# Patient Record
Sex: MALE | Race: OTHER | HISPANIC OR LATINO
[De-identification: names, ages, dates, MRNs, and addresses within clinical notes are randomized per-mention and may not be internally consistent; named-entity substitution may affect disease eponyms.]

---

## 2017-04-04 ENCOUNTER — APPOINTMENT (OUTPATIENT)
Dept: GASTROENTEROLOGY | Facility: CLINIC | Age: 57
End: 2017-04-04

## 2017-04-04 VITALS
SYSTOLIC BLOOD PRESSURE: 128 MMHG | BODY MASS INDEX: 32.49 KG/M2 | OXYGEN SATURATION: 96 % | DIASTOLIC BLOOD PRESSURE: 76 MMHG | HEART RATE: 66 BPM | HEIGHT: 65 IN | WEIGHT: 195 LBS | RESPIRATION RATE: 14 BRPM | TEMPERATURE: 98.1 F

## 2018-08-13 ENCOUNTER — APPOINTMENT (OUTPATIENT)
Dept: ORTHOPEDIC SURGERY | Facility: CLINIC | Age: 58
End: 2018-08-13
Payer: COMMERCIAL

## 2018-08-13 VITALS
WEIGHT: 192 LBS | SYSTOLIC BLOOD PRESSURE: 125 MMHG | HEART RATE: 87 BPM | BODY MASS INDEX: 31.99 KG/M2 | DIASTOLIC BLOOD PRESSURE: 68 MMHG | HEIGHT: 65 IN

## 2018-08-13 DIAGNOSIS — Z86.39 PERSONAL HISTORY OF OTHER ENDOCRINE, NUTRITIONAL AND METABOLIC DISEASE: ICD-10-CM

## 2018-08-13 DIAGNOSIS — M54.5 LOW BACK PAIN: ICD-10-CM

## 2018-08-13 DIAGNOSIS — Z87.891 PERSONAL HISTORY OF NICOTINE DEPENDENCE: ICD-10-CM

## 2018-08-13 PROCEDURE — 99204 OFFICE O/P NEW MOD 45 MIN: CPT

## 2018-08-13 RX ORDER — DOXYCYCLINE 100 MG/1
100 CAPSULE ORAL
Qty: 14 | Refills: 0 | Status: ACTIVE | COMMUNITY
Start: 2018-04-10

## 2018-08-13 RX ORDER — LEVOTHYROXINE SODIUM 0.09 MG/1
88 TABLET ORAL
Qty: 90 | Refills: 0 | Status: ACTIVE | COMMUNITY
Start: 2018-07-23

## 2018-08-13 RX ORDER — BENZONATATE 200 MG/1
200 CAPSULE ORAL
Qty: 20 | Refills: 0 | Status: ACTIVE | COMMUNITY
Start: 2018-04-10

## 2018-08-13 RX ORDER — LEVOCETIRIZINE DIHYDROCHLORIDE 5 MG/1
5 TABLET ORAL
Qty: 30 | Refills: 0 | Status: ACTIVE | COMMUNITY
Start: 2018-07-26

## 2018-08-13 RX ORDER — EZETIMIBE AND SIMVASTATIN 10; 40 MG/1; MG/1
10-40 TABLET ORAL
Refills: 0 | Status: ACTIVE | COMMUNITY

## 2018-08-13 RX ORDER — FENOFIBRIC ACID 45 MG/1
CAPSULE, DELAYED RELEASE ORAL
Refills: 0 | Status: ACTIVE | COMMUNITY

## 2018-08-13 RX ORDER — EZETIMIBE AND SIMVASTATIN 10; 20 MG/1; MG/1
10-20 TABLET ORAL
Qty: 90 | Refills: 0 | Status: ACTIVE | COMMUNITY
Start: 2018-03-19

## 2018-08-13 RX ORDER — TOBRAMYCIN 3 MG/ML
0.3 SOLUTION/ DROPS OPHTHALMIC
Qty: 5 | Refills: 0 | Status: ACTIVE | COMMUNITY
Start: 2018-04-10

## 2018-08-13 RX ORDER — MUPIROCIN 20 MG/G
2 OINTMENT TOPICAL
Qty: 22 | Refills: 0 | Status: ACTIVE | COMMUNITY
Start: 2018-07-26

## 2018-08-13 RX ORDER — CLOBETASOL PROPIONATE 0.5 MG/G
0.05 GEL TOPICAL
Qty: 60 | Refills: 0 | Status: ACTIVE | COMMUNITY
Start: 2018-07-26

## 2018-08-13 RX ORDER — MOMETASONE 50 UG/1
50 SPRAY, METERED NASAL
Qty: 17 | Refills: 0 | Status: ACTIVE | COMMUNITY
Start: 2018-03-17

## 2018-08-13 RX ORDER — METHYLPREDNISOLONE 4 MG/1
4 TABLET ORAL
Qty: 21 | Refills: 0 | Status: ACTIVE | COMMUNITY
Start: 2018-06-04

## 2018-08-13 RX ORDER — PREDNISONE 10 MG/1
10 TABLET ORAL
Qty: 54 | Refills: 0 | Status: ACTIVE | COMMUNITY
Start: 2018-07-26

## 2018-08-13 RX ORDER — FENOFIBRIC ACID 135 MG/1
135 CAPSULE, DELAYED RELEASE ORAL
Qty: 90 | Refills: 0 | Status: ACTIVE | COMMUNITY
Start: 2018-03-19

## 2018-08-13 RX ORDER — LEVOTHYROXINE SODIUM 75 UG/1
75 TABLET ORAL
Refills: 0 | Status: ACTIVE | COMMUNITY

## 2018-08-13 RX ORDER — CALCIUM CARBONATE/VITAMIN D3 600 MG-10
TABLET ORAL
Refills: 0 | Status: ACTIVE | COMMUNITY

## 2018-08-13 RX ORDER — UBIDECARENONE/VIT E ACET 100MG-5
CAPSULE ORAL
Refills: 0 | Status: ACTIVE | COMMUNITY

## 2019-09-10 ENCOUNTER — APPOINTMENT (OUTPATIENT)
Dept: GASTROENTEROLOGY | Facility: CLINIC | Age: 59
End: 2019-09-10
Payer: COMMERCIAL

## 2019-09-10 VITALS
SYSTOLIC BLOOD PRESSURE: 120 MMHG | DIASTOLIC BLOOD PRESSURE: 82 MMHG | TEMPERATURE: 98 F | BODY MASS INDEX: 32.32 KG/M2 | RESPIRATION RATE: 14 BRPM | WEIGHT: 194 LBS | HEART RATE: 67 BPM | OXYGEN SATURATION: 96 % | HEIGHT: 65 IN

## 2019-09-10 PROCEDURE — 99214 OFFICE O/P EST MOD 30 MIN: CPT

## 2019-09-10 NOTE — PHYSICAL EXAM
[General Appearance - Alert] : alert [General Appearance - In No Acute Distress] : in no acute distress [Sclera] : the sclera and conjunctiva were normal [Outer Ear] : the ears and nose were normal in appearance [Neck Appearance] : the appearance of the neck was normal [Auscultation Breath Sounds / Voice Sounds] : lungs were clear to auscultation bilaterally [Heart Sounds] : normal S1 and S2 [Heart Rate And Rhythm] : heart rate was normal and rhythm regular [Heart Sounds Gallop] : no gallops [Murmurs] : no murmurs [Heart Sounds Pericardial Friction Rub] : no pericardial rub [Bowel Sounds] : normal bowel sounds [Abdomen Soft] : soft [Abdomen Tenderness] : non-tender [] : no hepato-splenomegaly [Abdomen Mass (___ Cm)] : no abdominal mass palpated [Abnormal Walk] : normal gait [Skin Color & Pigmentation] : normal skin color and pigmentation [Oriented To Time, Place, And Person] : oriented to person, place, and time

## 2019-09-10 NOTE — CONSULT LETTER
[Dear  ___] : Dear  [unfilled], [Courtesy Letter:] : I had the pleasure of seeing your patient, [unfilled], in my office today. [Please see my note below.] : Please see my note below. [Consult Closing:] : Thank you very much for allowing me to participate in the care of this patient.  If you have any questions, please do not hesitate to contact me. [Sincerely,] : Sincerely, [FreeTextEntry2] : Chris Travis

## 2019-09-10 NOTE — ASSESSMENT
[FreeTextEntry1] : 58yo M presented to office for followup after his second episode of diverticulitis s/p antibiotic therapy. \par \par \par \par Plan: \par \par Advised increased hydration, fiber supplements to regulate bowel movements. Patient is due for colonoscopy in 2020, advised that he call to schedule then. \par

## 2019-09-10 NOTE — HISTORY OF PRESENT ILLNESS
[de-identified] : 60yo M with pmh Thyroid Dz, HLD referred by PCP for followup after a recent episode of diverticulitis. A few weeks ago, patient had developed LLQ pain and constipation, and had a CT scan which noted diverticulitis. He was treated with metronidazole and ciprofloxacin, and reports a significant improvement in symptoms since then, with only complaint of mild tenderness at LLQ. Recent WBC count ordered by PCP was normal. He states he has regular bms at this time.\par Of note, his last colonoscopy in 2010 was wnl.\par

## 2019-12-17 ENCOUNTER — APPOINTMENT (OUTPATIENT)
Dept: GASTROENTEROLOGY | Facility: CLINIC | Age: 59
End: 2019-12-17

## 2020-06-12 ENCOUNTER — EMERGENCY (EMERGENCY)
Facility: HOSPITAL | Age: 60
LOS: 1 days | Discharge: HOME | End: 2020-06-12
Attending: STUDENT IN AN ORGANIZED HEALTH CARE EDUCATION/TRAINING PROGRAM | Admitting: EMERGENCY MEDICINE
Payer: OTHER MISCELLANEOUS

## 2020-06-12 VITALS
SYSTOLIC BLOOD PRESSURE: 161 MMHG | OXYGEN SATURATION: 98 % | HEART RATE: 90 BPM | DIASTOLIC BLOOD PRESSURE: 93 MMHG | TEMPERATURE: 98 F | RESPIRATION RATE: 16 BRPM

## 2020-06-12 LAB
ALBUMIN SERPL ELPH-MCNC: 4.7 G/DL — SIGNIFICANT CHANGE UP (ref 3.3–5)
ALP SERPL-CCNC: 41 U/L — SIGNIFICANT CHANGE UP (ref 40–120)
ALT FLD-CCNC: 45 U/L — HIGH (ref 4–41)
ANION GAP SERPL CALC-SCNC: 15 MMO/L — HIGH (ref 7–14)
APTT BLD: 28.5 SEC — SIGNIFICANT CHANGE UP (ref 27.5–36.3)
AST SERPL-CCNC: 28 U/L — SIGNIFICANT CHANGE UP (ref 4–40)
BILIRUB SERPL-MCNC: 0.3 MG/DL — SIGNIFICANT CHANGE UP (ref 0.2–1.2)
BLD GP AB SCN SERPL QL: NEGATIVE — SIGNIFICANT CHANGE UP
BUN SERPL-MCNC: 21 MG/DL — SIGNIFICANT CHANGE UP (ref 7–23)
CALCIUM SERPL-MCNC: 9.9 MG/DL — SIGNIFICANT CHANGE UP (ref 8.4–10.5)
CHLORIDE SERPL-SCNC: 108 MMOL/L — HIGH (ref 98–107)
CO2 SERPL-SCNC: 20 MMOL/L — LOW (ref 22–31)
CREAT SERPL-MCNC: 0.73 MG/DL — SIGNIFICANT CHANGE UP (ref 0.5–1.3)
GLUCOSE SERPL-MCNC: 100 MG/DL — HIGH (ref 70–99)
HCT VFR BLD CALC: 41.1 % — SIGNIFICANT CHANGE UP (ref 39–50)
HGB BLD-MCNC: 13.6 G/DL — SIGNIFICANT CHANGE UP (ref 13–17)
INR BLD: 0.91 — SIGNIFICANT CHANGE UP (ref 0.88–1.17)
MCHC RBC-ENTMCNC: 29.4 PG — SIGNIFICANT CHANGE UP (ref 27–34)
MCHC RBC-ENTMCNC: 33.1 % — SIGNIFICANT CHANGE UP (ref 32–36)
MCV RBC AUTO: 88.8 FL — SIGNIFICANT CHANGE UP (ref 80–100)
NRBC # FLD: 0 K/UL — SIGNIFICANT CHANGE UP (ref 0–0)
PLATELET # BLD AUTO: 307 K/UL — SIGNIFICANT CHANGE UP (ref 150–400)
PMV BLD: 8.9 FL — SIGNIFICANT CHANGE UP (ref 7–13)
POTASSIUM SERPL-MCNC: 4.2 MMOL/L — SIGNIFICANT CHANGE UP (ref 3.5–5.3)
POTASSIUM SERPL-SCNC: 4.2 MMOL/L — SIGNIFICANT CHANGE UP (ref 3.5–5.3)
PROT SERPL-MCNC: 7.4 G/DL — SIGNIFICANT CHANGE UP (ref 6–8.3)
PROTHROM AB SERPL-ACNC: 10.4 SEC — SIGNIFICANT CHANGE UP (ref 9.8–13.1)
RBC # BLD: 4.63 M/UL — SIGNIFICANT CHANGE UP (ref 4.2–5.8)
RBC # FLD: 14.5 % — SIGNIFICANT CHANGE UP (ref 10.3–14.5)
RH IG SCN BLD-IMP: POSITIVE — SIGNIFICANT CHANGE UP
SODIUM SERPL-SCNC: 143 MMOL/L — SIGNIFICANT CHANGE UP (ref 135–145)
WBC # BLD: 5.39 K/UL — SIGNIFICANT CHANGE UP (ref 3.8–10.5)
WBC # FLD AUTO: 5.39 K/UL — SIGNIFICANT CHANGE UP (ref 3.8–10.5)

## 2020-06-12 PROCEDURE — 73140 X-RAY EXAM OF FINGER(S): CPT | Mod: 26,RT

## 2020-06-12 PROCEDURE — 99284 EMERGENCY DEPT VISIT MOD MDM: CPT

## 2020-06-12 RX ORDER — OXYCODONE AND ACETAMINOPHEN 5; 325 MG/1; MG/1
1 TABLET ORAL ONCE
Refills: 0 | Status: DISCONTINUED | OUTPATIENT
Start: 2020-06-12 | End: 2020-06-12

## 2020-06-12 RX ADMIN — OXYCODONE AND ACETAMINOPHEN 1 TABLET(S): 5; 325 TABLET ORAL at 15:26

## 2020-06-12 RX ADMIN — Medication 100 MILLIGRAM(S): at 15:50

## 2020-06-12 NOTE — ED PROVIDER NOTE - ATTENDING CONTRIBUTION TO CARE
60 y/o M sent in from [facility] with an open fracture of the left 2nd distal phalanx and an oblique fracture of the left 1st distal phalanx. Plan to obtain x-ray, obtain labs, provide pain control, and hand surgeon consulted.    CHIRAG Haywood: I have personally performed a face to face bedside history and physical examination of this patient. I have discussed the history, examination, review of systems, assessment and plan of management with the resident. I have reviewed the electronic medical record and amended it to reflect my history, review of systems, physical exam, assessment and plan.

## 2020-06-12 NOTE — ED PROVIDER NOTE - PATIENT PORTAL LINK FT
You can access the FollowMyHealth Patient Portal offered by [facility] by registering at the following website: [email or web address]. By joining [employer] [facility]’s FollowMyHealth portal, you will also be able to view your health information using other applications (apps) compatible with our system.

## 2020-06-12 NOTE — ED PROVIDER NOTE - CARE PROVIDER_API CALL
Mila Rodriguez (MD)  Surgery  [address], [address]  [place] [address]  Phone: [phone]  Fax: [phone]  Scheduled Appointment: 06/16/2020

## 2020-06-12 NOTE — PROCEDURE NOTE - NSLAC1DETAILSPROC_SKIN_A_CORE
wound explored to base in bloodless field/non-extensive debridement/nail excised/all foreign material removed/subungal hematoma evacuated

## 2020-06-12 NOTE — ED ADULT NURSE NOTE - OBJECTIVE STATEMENT
Patient reports that he cut his finger at work, patient noted with gauze to his finger and with bleeding controlled

## 2020-06-12 NOTE — CONSULT NOTE ADULT - SUBJECTIVE AND OBJECTIVE BOX
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  He was initially seen at [facility] and transferred to [facility] to see hand specialist.  Was given tetanus and clindamycin.  xray revealed distal phalanx fracture.    PMH/PSH: hypothyroid, hyperlipidemia, HTN  NKDA  Meds: thyroid, statin, anti-HTN  Soc: RHD, nonsmoker, [employer]  Fam: noncontrib    ROS: as per HPi and ow neg    PE:  NAD  Alert  MMM  pERRL  Right index finger with oblique laceration ulnar to nail  Distal tip viable, NVI  ROM limited by pain, swelling  Large subungal hematoma    xray: distal phalanx fracture    A/P: 58 yo RHD M with RIF laceration and distal phalanx fracture.    Subungal hematoma evacuated, fracture reduced, laceration repaired as per procedure note  Keep dressing clean and dry  Follow up TUES for 1st dressing change  Home on abx, pain medication    Mila Rodriguez MD  Plastic Surgery

## 2020-06-12 NOTE — ED PROVIDER NOTE - NSFOLLOWUPINSTRUCTIONS_ED_ALL_ED_FT
- take antibiotics as prescribed  - follow up with hand surgeon's office on Tuesday    1) Take 400-600mg Ibuprofen (also called Advil or Mortrin) every 6 hours as needed for fever/pain/discomfort. You can take this with Tylenol 650-1000 mg (also called acetaminophen) every 6 hours. You can take these medications 3 hrs apart in order to have a layered effect. Don't use more than 4000 mg of Tylenol in any 24-hour period. Make sure your other prescription/over-the-counter medications don't contain any Tylenol so you don't take too much. If you have any stomach discomfort while taking Motrin, you can use TUMS or Pepcid or Zantac (these can all be bought without a prescription). Please do not take these medications if you do no have pain or if you have any history of bleeding disorders, kidney or liver disease. Do not use ibuprofen if you are on blood thinners (anti-coagulation).  2) If your pain is NOT well controlled with these medications, take pain medication as prescribed.  3) Drink at least 2 Liters or 64 Ounces of water each day.

## 2020-06-12 NOTE — ED PROVIDER NOTE - PROGRESS NOTE DETAILS
PGY2/MD Melvina. Consulted, Dr. Leung's office. spoke to the on-call doc, abx with [facility], pt will be seen by the hand surgeon soon. PGY2/MD Melvina. Hand surgeon was at the bedside, sutured, follow up with their office on next Tuesdeay. augmentin, for home. I have given the pt strict return and follow up precautions. The patient has been provided with a copy of all pertinent results. The patient has been informed of all concerning signs and symptoms to return to Emergency Department, the necessity to follow up with PMD/Clinic/follow up provided within 2-3 days was explained, and the patient reports understanding of above with capacity and insight.

## 2020-06-12 NOTE — ED PROVIDER NOTE - PHYSICAL EXAMINATION
PGY2/MD Melvina.   Left Hand Exam  Sensation: Sensation intact to light touch in first dorsalis web space, 5th/3rd finger volar tip.  Motor: + 2nd finger, distal pharynx, wobling, bluish discolored nail, volar part with pink good blood flow, + oozing bleed  Skin/Inspection: + skin defect,   Vascular: volar part of the finger has blood flow,

## 2020-06-12 NOTE — ED ADULT NURSE NOTE - CHIEF COMPLAINT QUOTE
Problem: Patient Care Overview  Goal: Plan of Care Review  Outcome: Ongoing (interventions implemented as appropriate)  Patient AAO x 4. VSS. NAD. Pain controlled via PRN pain meds. 2L oxygen. Uses bedpan to void.  No complaints of nausea or vomiting. Currently awaiting discharge to SNF. Safety maintained. Will continue to monitor.        Pt with laceration to R index finger.  Bleeding controlled

## 2020-06-12 NOTE — ED PROVIDER NOTE - CLINICAL SUMMARY MEDICAL DECISION MAKING FREE TEXT BOX
58 y/o M sent in from [facility] with an open fracture of the left 2nd distal phalanx and an oblique fracture of the left 1st distal phalanx. Plan to obtain x-ray, obtain labs, provide pain control, and hand surgeon consulted.

## 2020-06-12 NOTE — ED PROVIDER NOTE - OBJECTIVE STATEMENT
58 y/o M with no significant PMHx sent from [facility] today for open fracture. Pt was seen today at [facility] after cutting fingers while using a saw belt machine at home. At the hospital pt was found to have an open fracture of the 2nd distal phalanx of the left hand and an oblique fracture of the 1st distal phalanx of the left hand. Pt reports that at [facility] his tetanus shot was updated , he was given Clindamycin, and he received a lidocaine injection for pain control. No ibuprofen or Tylenol was given prior to arrival. Denies having any fever, chills, chest pain, SOB, or other medical complaints. No other injuries or trauma.

## 2021-07-26 NOTE — PROCEDURE NOTE - NSLAC1DEPTH_SKIN_A_CORE
Patient's first and last name, , procedure, and correct site confirmed prior to the start of procedure.
skin
Patient's first and last name, , procedure, and correct site confirmed prior to the start of procedure.

## 2022-08-17 PROBLEM — Z78.9 OTHER SPECIFIED HEALTH STATUS: Chronic | Status: ACTIVE | Noted: 2020-06-12

## 2022-08-25 ENCOUNTER — APPOINTMENT (OUTPATIENT)
Dept: ORTHOPEDIC SURGERY | Facility: CLINIC | Age: 62
End: 2022-08-25

## 2022-08-25 VITALS
HEIGHT: 65 IN | BODY MASS INDEX: 29.16 KG/M2 | SYSTOLIC BLOOD PRESSURE: 130 MMHG | WEIGHT: 175 LBS | HEART RATE: 68 BPM | DIASTOLIC BLOOD PRESSURE: 73 MMHG

## 2022-08-25 PROCEDURE — 99072 ADDL SUPL MATRL&STAF TM PHE: CPT

## 2022-08-25 PROCEDURE — 99204 OFFICE O/P NEW MOD 45 MIN: CPT

## 2022-08-25 NOTE — PHYSICAL EXAM
[de-identified] : Constitutional: Well-nourished, well-developed, No acute distress\par Respiratory:  Good respiratory effort, no SOB\par Psychiatric: Pleasant and normal affect, alert and oriented x3\par Skin: Clean dry and intact B/L UE\par Musculoskeletal: normal except where as noted in regional exam\par \par \par Right Shoulder:\par APPEARANCE: no marked deformities, no swelling or malalignment\par POSITIVE TENDERNESS: none\par NONTENDER: supraspinatus, infraspinatus, teres minor, LH biceps, anterior and posterior capsule, AC joint\par ROM: full & painless, no scapular winging or dyskinesia present\par RESISTIVE TESTING: painless 5/5 resisted flex/ext, empty can/ER/IR, horizontal abd/add \par SPECIAL TESTS: neg Drop Arm, neg Empty Can, neg Morales/Neers, neg Tran's, neg Speeds, neg Apprehension, neg cross arm adduction, neg apley's scratch test\par \par Left Shoulder:\par APPEARANCE: no marked deformities, no swelling or malalignment\par POSITIVE TENDERNESS: supraspinatus, long head biceps tendon\par NONTENDER:  infraspinatus, teres minor. biceps. anterior and posterior capsule. AC joint. \par ROM: full with mild painful arc past 60 degrees, no scapular winging or dyskinesia present\par RESISTIVE TESTING: MMT 4+/5 ER, Flexion and Empty can, 5/5 IR. painless 5/5 resisted ext, horizontal abd/add \par SPECIAL TESTS: + Morales and Neers, mildly + cross arm adduction, + Speeds, neg Tran's, neg Drop Arm, neg Apprehension. neg apley's scratch test\par \par  [de-identified] : I reviewed, interpreted and clinically correlated the following outside imaging studies,\par Outside x-rays, 3 views of left shoulder, evidence of mild glenohumeral osteoarthritis, there is a minimal punctate calcific deposit on the posterolateral portion of the humeral head consistent with calcific tendinitis, no acute fracture seen.\par

## 2022-08-25 NOTE — DISCUSSION/SUMMARY
[de-identified] : Discussed findings of today's exam and possible causes of patient's pain.  Educated patient on their most probable diagnosis of acute left shoulder impingement status post fall.  Reviewed possible courses of treatment, and we collaboratively decided best course of treatment at this time will include conservative management.  Patient started on a course of oral NSAIDs, prescription given for Naprosyn (We discussed all possible side effects of this medication).  Patient will be started on a course of physical therapy to restore normal range of motion and strength as tolerated.  Patient has been able to continue his regular work duties since time of injury, he may continue to do so.  Patient is given reassurance he has no evidence of a significant rotator cuff tear or rupture, no surgical indications, no need for MRI at this time.  Follow up as needed.  Patient and spouse appreciate and agree with current plan.\par \par I work as part of an academic orthopedic group and routinely have a physician in training (resident / fellow) working with me.  Any part of the history and physical exam performed by the physician in training was either directly reviewed and/or replicated by myself.  Any procedure performed by the physician in training was performed under my direct supervision and with the consent of the patient.\par \par This note was generated using dragon medical dictation software.  A reasonable effort has been made for proofreading its contents, but typos may still remain.  If there are any questions or points of clarification needed please notify my office.

## 2022-08-25 NOTE — HISTORY OF PRESENT ILLNESS
[de-identified] : LHD Patient is here for left shoulder pain that began on 8/6/22 when he was stripping a floor and he slipped. He was seen at [facility] where xrays were taken that were negative for fracture. He was prescribed an NSAID and muscle relaxer which has helped. Denies N/T/R/Prior injury. He took a few days off but returned to work. \par \par The patient's past medical history, past surgical history, medications and allergies were reviewed by me today and documented accordingly. In addition, the patient's family and social history, which were noncontributory to this visit, were reviewed also. Intake form was reviewed. The patient has no family history of arthritis.

## 2022-09-21 ENCOUNTER — RX RENEWAL (OUTPATIENT)
Age: 62
End: 2022-09-21

## 2022-09-21 RX ORDER — NAPROXEN 500 MG/1
500 TABLET ORAL
Qty: 60 | Refills: 0 | Status: ACTIVE | COMMUNITY
Start: 2022-08-25 | End: 1900-01-01

## 2022-10-24 ENCOUNTER — RX RENEWAL (OUTPATIENT)
Age: 62
End: 2022-10-24

## 2022-12-15 ENCOUNTER — APPOINTMENT (OUTPATIENT)
Dept: ORTHOPEDIC SURGERY | Facility: CLINIC | Age: 62
End: 2022-12-15

## 2022-12-15 VITALS
SYSTOLIC BLOOD PRESSURE: 116 MMHG | HEIGHT: 65 IN | BODY MASS INDEX: 29.32 KG/M2 | OXYGEN SATURATION: 98 % | TEMPERATURE: 97.9 F | DIASTOLIC BLOOD PRESSURE: 70 MMHG | HEART RATE: 75 BPM | WEIGHT: 176 LBS

## 2022-12-15 PROCEDURE — 99213 OFFICE O/P EST LOW 20 MIN: CPT

## 2022-12-15 PROCEDURE — 99072 ADDL SUPL MATRL&STAF TM PHE: CPT

## 2022-12-15 NOTE — PHYSICAL EXAM
[de-identified] : Constitutional: Well-nourished, well-developed, No acute distress\par Respiratory:  Good respiratory effort, no SOB\par Psychiatric: Pleasant and normal affect, alert and oriented x3\par Skin: Clean dry and intact B/L UE\par Musculoskeletal: normal except where as noted in regional exam\par \par Left Shoulder:\par APPEARANCE: no marked deformities, no swelling or malalignment\par POSITIVE TENDERNESS: supraspinatus, long head biceps tendon\par NONTENDER:  infraspinatus, teres minor. biceps. anterior and posterior capsule. AC joint. \par ROM: full with mild painful arc past 60 degrees, no scapular winging or dyskinesia present\par RESISTIVE TESTING: MMT 4+/5 ER, Flexion and Empty can, 5/5 IR. painless 5/5 resisted ext, horizontal abd/add \par SPECIAL TESTS: + Morales and Neers, mildly + cross arm adduction, + Speeds, neg Tran's, neg Drop Arm, neg Apprehension. neg apley's scratch test\par \par

## 2022-12-15 NOTE — HISTORY OF PRESENT ILLNESS
[de-identified] : Patient is here for left shoulder pain follow up. Patient is attending PT and has noticed improvement. He would like to continue going. He has pain with crossarm adduction. No recent injury.

## 2022-12-15 NOTE — DISCUSSION/SUMMARY
[de-identified] : Patient was seen today for reevaluation of chronic intermittent left shoulder pain, he still has exam findings consistent with mild subacromial impingement.  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  Patient has been regularly going to physical therapy, recommend continued course of physical therapy at this time.  No indication for MRI as patient has no surgical indications based on history and clinical exam.  Patient's pain level is only mild and intermittent, no need for cortisone injection at this time.  Follow up as needed.  Patient appreciates and agrees with current plan.\par \par I work as part of an academic orthopedic group and routinely have a physician in training (resident / fellow) working with me.  Any part of the history and physical exam performed by the physician in training was either directly reviewed and/or replicated by myself.  Any procedure performed by the physician in training was performed under my direct supervision and with the consent of the patient.\par \par This note was generated using dragon medical dictation software.  A reasonable effort has been made for proofreading its contents, but typos may still remain.  If there are any questions or points of clarification needed please notify my office.\par

## 2022-12-18 ENCOUNTER — FORM ENCOUNTER (OUTPATIENT)
Age: 62
End: 2022-12-18

## 2023-02-06 ENCOUNTER — FORM ENCOUNTER (OUTPATIENT)
Age: 63
End: 2023-02-06

## 2023-03-16 ENCOUNTER — APPOINTMENT (OUTPATIENT)
Dept: ORTHOPEDIC SURGERY | Facility: CLINIC | Age: 63
End: 2023-03-16
Payer: OTHER MISCELLANEOUS

## 2023-03-16 VITALS
HEART RATE: 68 BPM | DIASTOLIC BLOOD PRESSURE: 77 MMHG | OXYGEN SATURATION: 96 % | BODY MASS INDEX: 29.16 KG/M2 | WEIGHT: 175 LBS | SYSTOLIC BLOOD PRESSURE: 129 MMHG | TEMPERATURE: 98 F | HEIGHT: 65 IN

## 2023-03-16 DIAGNOSIS — M75.42 IMPINGEMENT SYNDROME OF LEFT SHOULDER: ICD-10-CM

## 2023-03-16 PROCEDURE — 99072 ADDL SUPL MATRL&STAF TM PHE: CPT

## 2023-03-16 PROCEDURE — 99213 OFFICE O/P EST LOW 20 MIN: CPT

## 2023-03-20 PROBLEM — M75.42 SHOULDER IMPINGEMENT, LEFT: Status: ACTIVE | Noted: 2022-08-25

## 2023-03-20 NOTE — DISCUSSION/SUMMARY
[de-identified] : Patient was seen today for reevaluation of chronic intermittent left shoulder pain  due to subacromial impingement.  Patient has no significant interval change in his condition since last evaluation.  Patient may continue his course of physical therapy.  Patient does not require any cortisone injection at this time as this is primarily indicated in the acute setting.  Patient has been able to continue his regular work duties without restrictions.  Due to the chronicity of the patient's pain and lack of response to conservative measures I recommend we proceed with MRI of the left shoulder at this time to evaluate for extent of partial rotator cuff tearing.  If there is any tearing found on MRI patient would benefit from surgical consultation to discuss risk/benefits of surgical intervention versus continued conservative care.  Patient will follow-up once MRI results are available.  Patient appreciates and agrees with current plan.\par \par \par This note was generated using dragon medical dictation software.  A reasonable effort has been made for proofreading its contents, but typos may still remain.  If there are any questions or points of clarification needed please notify my office.

## 2023-03-20 NOTE — PHYSICAL EXAM
[de-identified] : Constitutional: Well-nourished, well-developed, No acute distress\par Respiratory:  Good respiratory effort, no SOB\par Psychiatric: Pleasant and normal affect, alert and oriented x3\par Skin: Clean dry and intact B/L UE\par Musculoskeletal: normal except where as noted in regional exam\par \par Left Shoulder:\par APPEARANCE: no marked deformities, no swelling or malalignment\par POSITIVE TENDERNESS: supraspinatus, long head biceps tendon\par NONTENDER:  infraspinatus, teres minor. biceps. anterior and posterior capsule. AC joint. \par ROM: full with mild painful arc past 60 degrees, no scapular winging or dyskinesia present\par RESISTIVE TESTING: MMT 4+/5 ER, Flexion and Empty can, 5/5 IR. painless 5/5 resisted ext, horizontal abd/add \par SPECIAL TESTS: + Morales and Neers, mildly + cross arm adduction, + Speeds, neg Tran's, neg Drop Arm, neg Apprehension. neg apley's scratch test\par \par

## 2023-03-22 ENCOUNTER — FORM ENCOUNTER (OUTPATIENT)
Age: 63
End: 2023-03-22

## 2023-04-19 ENCOUNTER — TRANSCRIPTION ENCOUNTER (OUTPATIENT)
Age: 63
End: 2023-04-19

## 2023-04-20 ENCOUNTER — OUTPATIENT (OUTPATIENT)
Dept: OUTPATIENT SERVICES | Facility: HOSPITAL | Age: 63
LOS: 1 days | End: 2023-04-20
Payer: COMMERCIAL

## 2023-04-20 ENCOUNTER — APPOINTMENT (OUTPATIENT)
Dept: MRI IMAGING | Facility: CLINIC | Age: 63
End: 2023-04-20
Payer: OTHER MISCELLANEOUS

## 2023-04-20 DIAGNOSIS — M75.42 IMPINGEMENT SYNDROME OF LEFT SHOULDER: ICD-10-CM

## 2023-04-20 PROCEDURE — 73221 MRI JOINT UPR EXTREM W/O DYE: CPT | Mod: 26,LT

## 2023-04-20 PROCEDURE — 73221 MRI JOINT UPR EXTREM W/O DYE: CPT

## 2023-04-25 ENCOUNTER — NON-APPOINTMENT (OUTPATIENT)
Age: 63
End: 2023-04-25

## 2023-04-27 ENCOUNTER — NON-APPOINTMENT (OUTPATIENT)
Age: 63
End: 2023-04-27

## 2023-05-01 ENCOUNTER — NON-APPOINTMENT (OUTPATIENT)
Age: 63
End: 2023-05-01

## 2023-05-11 ENCOUNTER — APPOINTMENT (OUTPATIENT)
Dept: ORTHOPEDIC SURGERY | Facility: CLINIC | Age: 63
End: 2023-05-11

## 2023-06-28 ENCOUNTER — NON-APPOINTMENT (OUTPATIENT)
Age: 63
End: 2023-06-28

## 2023-06-29 ENCOUNTER — APPOINTMENT (OUTPATIENT)
Dept: ORTHOPEDIC SURGERY | Facility: CLINIC | Age: 63
End: 2023-06-29
Payer: OTHER MISCELLANEOUS

## 2023-06-29 VITALS
TEMPERATURE: 97.2 F | SYSTOLIC BLOOD PRESSURE: 143 MMHG | BODY MASS INDEX: 29.66 KG/M2 | WEIGHT: 178 LBS | HEART RATE: 64 BPM | HEIGHT: 65 IN | DIASTOLIC BLOOD PRESSURE: 83 MMHG | OXYGEN SATURATION: 98 %

## 2023-06-29 DIAGNOSIS — M75.102 UNSPECIFIED ROTATOR CUFF TEAR OR RUPTURE OF LEFT SHOULDER, NOT SPECIFIED AS TRAUMATIC: ICD-10-CM

## 2023-06-29 DIAGNOSIS — M25.512 PAIN IN LEFT SHOULDER: ICD-10-CM

## 2023-06-29 DIAGNOSIS — M67.912 UNSPECIFIED DISORDER OF SYNOVIUM AND TENDON, LEFT SHOULDER: ICD-10-CM

## 2023-06-29 PROCEDURE — 99203 OFFICE O/P NEW LOW 30 MIN: CPT

## 2023-06-29 NOTE — PHYSICAL EXAM
[Normal] : Gait: normal [UE] : Sensory: Intact in bilateral upper extremities [Rad] : radial 2+ and symmetric bilaterally [de-identified] : Pleasant male, no apparent distress. [de-identified] : Left shoulder: Skin intact.  No warmth.  No swelling.  No bony tenderness.  Left shoulder active motion 155 degrees forward elevation, 105 degrees abduction, internal rotation hand to upper lumbar region.  Grossly intact forward elevation, abduction, rotation strength with manual muscle testing.  Mild discomfort with Morales impingement maneuver.  Negative Speed's test.  [de-identified] :  MR SHOULDER LT - ORDERED BY: RAFFI BAL\par \par \par PROCEDURE DATE: 04/20/2023\par \par \par \par INTERPRETATION: LEFT SHOULDER MRI\par \par CLINICAL INFORMATION: Shoulder pain\par TECHNIQUE: Multiplanar, multisequence MR imaging was obtained of the left shoulder.\par COMPARISON: None available.\par \par FINDINGS:\par \par ROTATOR CUFF: There is moderate tendinosis of the supraspinatus and infraspinatus. Intrasubstance insertional tearing is seen within the mid supraspinatus that extends towards the articular fibers anteriorly and towards the bursal fibers near the junctional fibers. No full-thickness tear is appreciated. At its largest, the rim rent tear measures up to 0.4 cm. There is mild undersurface tearing of the mid and anterior infraspinatus. There is subscapularis tendinosis with mild under sided fraying. No full-thickness tendon tear or retraction.\par BICEPS TENDON: Within the groove and normal in appearance.\par AC JOINT: Moderate productive change at the acromioclavicular joint with undersurface spurring. Mild edema in the subacromial/subdeltoid bursa.\par GLENOID LABRUM AND GLENOHUMERAL LIGAMENTS: Increased signal and degeneration of the superior and posterior superior labrum. No displaced labral tear.. The inferior glenohumeral ligament is preserved.\par GLENOHUMERAL CARTILAGE AND SUBCHONDRAL BONE: Preserved\par SYNOVIUM/JOINT FLUID: No glenoid humeral joint effusion.\par BONE MARROW: No fracture.\par MUSCLES: Moderate fatty infiltration of the teres minor. Mild fatty infiltration of the infraspinatus muscle.\par NEUROVASCULAR STRUCTURES: Preserved\par SUBCUTANEOUS SOFT TISSUES: No soft tissue swelling.\par \par IMPRESSION:\par \par 1. Moderate intrasubstance tearing of the supraspinatus in the setting of tendinosis with insertional tearing that extends anteriorly towards the articular surface and posteriorly towards the bursal surface. Moderate subscapularis and infraspinatus tendinosis. Mild undersurface tearing of the mid and anterior infraspinatus.\par 2. Moderate acromioclavicular arthropathy with undersurface spurring mild bursitis.\par \par --- End of Report ---\par \par

## 2023-06-29 NOTE — HISTORY OF PRESENT ILLNESS
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 He had to leave work early and was evaluated at [facility] and returned to work after a few days. He was evaluated by RITA Dixon who referred him for PT. He has attended a prolonged course f PT and still having pain. he was subsequently referred for an MRI by Dr Dixon and advised on surgical evaluation today. He has pain localized to the shoulder joint and worsened with exertional use of the shoulder and sleeping on his side. He is not using any medication for pain. He has been working regular duty.  [0] : a current pain level of 0/10

## 2023-06-29 NOTE — DISCUSSION/SUMMARY
[de-identified] : MRI findings were reviewed with the patient.  He has findings of rotator cuff tendinopathy with some intrasubstance tearing and small low-grade articular sided tear.  He has good range of motion and strength on exam.  Pain symptoms are low and intermittent and quite tolerable.  No surgical intervention is required to treat the shoulder at this time.  I explained to him that cuff tendinopathy/partial tears could potentially progress with exertional use/time leading to increased symptoms.  Follow-up will be as needed.  Work status–full duty as tolerated.

## 2024-01-08 NOTE — ED ADULT NURSE NOTE - SUICIDE SCREENING QUESTION 3
24  Jesus Sanches : 1948 Sex: male  Age: 75 y.o.      Assessment and Plan:  Diagnoses and all orders for this visit:    Gastroesophageal reflux disease without esophagitis  -     omeprazole (PRILOSEC) 40 MG delayed release capsule; Take 1 capsule by mouth Daily  -     External Referral To Gastroenterology  Start PPI, higher dose for now and daily   Refer to GI for further eval  May need EEG    Indigestion  -     External Referral To Gastroenterology    Other constipation  -     External Referral To Gastroenterology      Return in about 6 weeks (around 2024).        Chief Complaint   Patient presents with    Gastroesophageal Reflux       HPI  Pt here for acute concerns     Indigestion   Burping all night  Bloating   Occasional pain in his stomach area   Constipated; states this is his norm depending on what he eat   Tums not helping  Taking omeprazole PRN only  Seems to work a bit when he takes it    Of note, pt's wife was in visit with him and got acutely ill and nearly passed out  There was some disjointedness obtaining pt's history as we were trying to medically manage his wife who is also my patient     Problem list reviewed and updated in full with patient today as necessary.  A comprehensive ROS was negative, except as documented above.       Current Outpatient Medications:     omeprazole (PRILOSEC) 40 MG delayed release capsule, Take 1 capsule by mouth Daily, Disp: 30 capsule, Rfl: 1    losartan (COZAAR) 50 MG tablet, Take 1 tablet by mouth daily, Disp: 90 tablet, Rfl: 0    atorvastatin (LIPITOR) 40 MG tablet, Take 1 tablet by mouth daily, Disp: 90 tablet, Rfl: 0    hydroCHLOROthiazide (HYDRODIURIL) 25 MG tablet, TAKE ONE TABLET BY MOUTH DAILY, Disp: 90 tablet, Rfl: 0  No Known Allergies    Pt's past medical and surgical history were reviewed and updated as necessary today   Pt's family and social history were reviewed and updated as necessary today      Vitals:    24 1346   BP: (!) 
No

## 2024-03-08 ENCOUNTER — NON-APPOINTMENT (OUTPATIENT)
Age: 64
End: 2024-03-08